# Patient Record
Sex: MALE | Race: OTHER | HISPANIC OR LATINO | URBAN - METROPOLITAN AREA
[De-identification: names, ages, dates, MRNs, and addresses within clinical notes are randomized per-mention and may not be internally consistent; named-entity substitution may affect disease eponyms.]

---

## 2018-09-25 ENCOUNTER — EMERGENCY (EMERGENCY)
Facility: HOSPITAL | Age: 33
LOS: 1 days | Discharge: HOME | End: 2018-09-25
Attending: EMERGENCY MEDICINE | Admitting: EMERGENCY MEDICINE
Payer: COMMERCIAL

## 2018-09-25 VITALS
TEMPERATURE: 98 F | HEART RATE: 91 BPM | OXYGEN SATURATION: 100 % | DIASTOLIC BLOOD PRESSURE: 97 MMHG | RESPIRATION RATE: 16 BRPM | SYSTOLIC BLOOD PRESSURE: 140 MMHG

## 2018-09-25 PROCEDURE — 99282 EMERGENCY DEPT VISIT SF MDM: CPT

## 2018-09-25 NOTE — ED PROVIDER NOTE - OBJECTIVE STATEMENT
33M with no sig pmh here with 24h of sore throat, myalgias and scrotal swelling. Denies f/c, trouble swallowing, neck stiffness, n/v, urinary symptoms, rash, cough, sob.  Pt denies trauma or recently aggressive exercise. Children at home with URI. 33M with no sig pmh here with 24h of sore throat, myalgias and intermittent testicular discomfort. Denies f/c, trouble swallowing, neck stiffness, n/v, urinary symptoms, penile discharge, rash, cough, sob.  Pt denies trauma or recently aggressive exercise. Children at home with URI.

## 2018-09-25 NOTE — ED SUB INTERN NOTE - MEDICAL DECISION MAKING DETAILS
Patient likely has a viral syndrome given the patient's several sick contacts, myalgias, and muscle pain. We will recommend symptomatic treatment with analgesics, hydration, and rest. There is no testicular swelling on exam. We recommend follow up with outpatient urologist should swelling continue.

## 2018-09-25 NOTE — ED PROVIDER NOTE - MEDICAL DECISION MAKING DETAILS
likely viral syndrome. pt offered urine testing for STIs and UTI but has no symptoms and can f/u with his urologist. Do not suspect torsion, renal stone. Will d/c home.

## 2018-09-25 NOTE — ED PROVIDER NOTE - PHYSICAL EXAMINATION
GEN - NAD; well appearing; A+O x3   HEAD - NC/AT     EYES - EOMI, no conjunctival pallor, no scleral icterus  ENT -   mucous membranes  moist , no discharge      NECK - Neck supple, no cervical LNA  PULM - CTA b/l,  symmetric breath sounds  COR -  RRR, S1 S2, no murmurs  ABD - , ND, NT, soft, no guarding, no rebound, no masses    BACK - no CVA tenderness, nontender spine     EXTREMS - no edema, no deformity, warm and well perfused    SKIN - no rash or bruising      NEUROLOGIC - alert, sensation nl, motor 5/5 RUE/LUE/RLE/LLE GEN - NAD; well appearing; A+O x3   HEAD - NC/AT     EYES - EOMI, no conjunctival pallor, no scleral icterus  ENT -   mucous membranes  moist , no discharge      NECK - Neck supple, no cervical LNA  PULM - CTA b/l,  symmetric breath sounds  COR -  RRR, S1 S2, no murmurs  ABD - , ND, NT, soft, no guarding, no rebound, no masses    BACK - no CVA tenderness, nontender spine     - no hernias, no testicular swelling or tenderness. No lesions.   EXTREMS - no edema, no deformity, warm and well perfused    SKIN - no rash or bruising      NEUROLOGIC - alert, sensation nl, motor 5/5 RUE/LUE/RLE/LLE

## 2018-09-25 NOTE — ED ADULT TRIAGE NOTE - CHIEF COMPLAINT QUOTE
Pt c/o "aches and pains" to B/L arms and back pf legs, L wrist pain x 1 month, sore throat,  and swelling to genital area. since yesterday. Denies penile discharge, fever/chills, urinary symptoms. Denies pmhx.

## 2018-09-25 NOTE — ED SUB INTERN NOTE - OBJECTIVE STATEMENT FT
Patient is a 33 year-old M with no significant PMH who presents with a one day history of sore throat, muscle pain, and testicular swelling. The patient states that yesterday morning, he awoke with a sore throat and stabbing pain in b/l biceps. He describes his throat as scratchy, and is not exacerbated or palliated by anything. He describes his biceps pain as intermittent 2/10, sharp, stabbing pain that is exacerbated by stretching his arms out. Nothing seems to palliate the pain. Later in the day, he reported pain in his hamstrings, which he also describes as 2/10, intermittent, and described as "like a bruise." Nothing seems to exacerbate or palliate this pain. Finally, later in the day, he noticed testicular swelling and throbbing (but not pain) that is exacerbated by standing and walking, and palliated by lying down. He states that he has no dysuria, penile discharge, pain, nausea, vomiting, or abdominal pain assocated with the swelling. Of note, he states that he has four children at home, all of whom have been sick within the last week. He has not received his flu shot this year. Patient is a 33 year-old M with no significant PMH who presents with a one day history of sore throat, muscle pain, and testicular swelling. The patient states that yesterday morning, he awoke with a sore throat and stabbing pain in b/l biceps. He describes his throat as scratchy, and is not exacerbated or palliated by anything. He describes his biceps pain as intermittent 2/10, sharp, stabbing pain that is exacerbated by stretching his arms out. Nothing seems to palliate the pain. Later in the day, he reported pain in his hamstrings, which he also describes as 2/10, intermittent, and described as "like a bruise." Nothing seems to exacerbate or palliate this pain. Finally, later in the day, he noticed testicular swelling and throbbing (but not pain) that is exacerbated by standing and walking, and palliated by lying down. He states that he has no dysuria, penile discharge, pain, nausea, vomiting, or abdominal pain assocated with the swelling. Of note, he states that he has four children at home, all of whom have been sick within the last week. He has not received his flu shot this year. He has no recent travel.

## 2018-09-25 NOTE — ED SUB INTERN NOTE - PHYSICAL EXAMINATION
Vitals: T:98.5F HR: 91 BP:140/97 RR: 16 SpO2: 100%  General: Patient is in no acute distress, non-toxic, and well appearing, sitting on the bed.   HEENT: EOMI, PEARLA, oropharynx clear with no erythema. No cervical lymphadenopathy. No thyromegaly  Respiratory: No chest wall abnormalities. Inspiratory effort adequate and symmetric. Lungs are clear to auscultation bilaterally. Appropriately tympanitic to percussion.   Back: No spinal tenderness, no CVA tenderness.  Cardiovascular: Regular rate and rhythm, normal S1, S2; No murmurs, rubs or gallops.   Abdomen: Abdomen has no scars or striae. Bowel sounds are present and normoactive. The abdomen is appropriately tympanitic to percussion. On palpation, abdomen is soft, non-tender, and non-distended. No organomegaly was appreciated.   Extremities:  No edema, no cyanosis, pedal pulses 2+ bilaterally, PT pulses 2+ bilaterally.   Psych: Patient is A&Ox3. No evidence of anxiety or depression.  Neuro: CN II-XII are intact. Strength, sensation, and coordination are intact. Reflexes are 2+ in bilateral upper and lower extremities. Gait exam is normal.  Skin: No rashes, striae, scars, or areas of hyperpigmentation. Vitals: T:98.5F HR: 91 BP:140/97 RR: 16 SpO2: 100%  General: Patient is in no acute distress, non-toxic, and well appearing, sitting on the bed.   HEENT: EOMI, PEARLA, oropharynx clear without exudates. Mild pharyngeal erythem.a No cervical lymphadenopathy. No thyromegaly  Respiratory: No chest wall abnormalities. Inspiratory effort adequate and symmetric. Lungs are clear to auscultation bilaterally. Appropriately tympanitic to percussion.   Back: No spinal tenderness, no CVA tenderness.  Cardiovascular: Regular rate and rhythm, normal S1, S2; No murmurs, rubs or gallops.   Abdomen: Abdomen has no scars or striae. Bowel sounds are present and normoactive. The abdomen is appropriately tympanitic to percussion. On palpation, abdomen is soft, non-tender, and non-distended. No organomegaly was appreciated.   Extremities:  No edema, no cyanosis, pedal pulses 2+ bilaterally, PT pulses 2+ bilaterally.   Psych: Patient is A&Ox3. No evidence of anxiety or depression.  Neuro: CN II-XII are intact. Strength and sensation intact. Reflexes are 2+ in bilateral upper and lower extremities. Gait exam is normal.  Skin: No rashes, striae, scars, or areas of hyperpigmentation. Several tattoos over upper body.   Urologic: Normal appearing testicles and penis. Testicles normal volume. No swelling or erythema. No lesions or bumps.

## 2018-09-25 NOTE — ED SUB INTERN NOTE - GENITOURINARY [-], MLM
no STD exposure/no dysmenorrhea/no pelvic pain/no hematuria/no difficulty urinating/No penile discharge, erectile dysfunction

## 2018-09-25 NOTE — ED SUB INTERN NOTE - FAMILY HISTORY
Grandparent  Still living? Unknown  Family history of prostate cancer, Age at diagnosis: Age Unknown     Mother  Still living? Unknown  Family history of thyroid disease in mother, Age at diagnosis: Age Unknown

## 2018-12-09 NOTE — ED ADULT TRIAGE NOTE - RESPIRATORY RATE (BREATHS/MIN)
Telephone Encounter by Blayne Pérez MD at 01/20/17 02:46 PM     Author:  Blayne Pérez MD Service:  (none) Author Type:  Physician     Filed:  01/20/17 02:46 PM Encounter Date:  1/20/2017 Status:  Signed     :  Blayne Pérez MD (Physician)            Likely growing pains.   If becomes more severe then we cna do an exam.  Call if pain >5-6/10, sudden, sharp or accompanied with bleeding.[AG1.1M]      Revision History        User Key Date/Time User Provider Type Action    > AG1.1 01/20/17 02:46 PM Blayne Pérez MD Physician Sign    M - Manual 16

## 2025-04-14 NOTE — ED CLERICAL - EXIT INTERVIEW COMPLETED
25-Sep-2018 10:43 Quality 431: Preventive Care And Screening: Unhealthy Alcohol Use - Screening: Patient not identified as an unhealthy alcohol user when screened for unhealthy alcohol use using a systematic screening method Detail Level: Detailed Quality 226: Preventive Care And Screening: Tobacco Use: Screening And Cessation Intervention: Patient screened for tobacco use and is an ex/non-smoker